# Patient Record
Sex: FEMALE | Race: WHITE
[De-identification: names, ages, dates, MRNs, and addresses within clinical notes are randomized per-mention and may not be internally consistent; named-entity substitution may affect disease eponyms.]

---

## 2019-08-23 ENCOUNTER — HOSPITAL ENCOUNTER (OUTPATIENT)
Dept: HOSPITAL 47 - RADCTMAIN | Age: 49
Discharge: HOME | End: 2019-08-23
Attending: FAMILY MEDICINE
Payer: COMMERCIAL

## 2019-08-23 DIAGNOSIS — R22.1: ICD-10-CM

## 2019-08-23 DIAGNOSIS — Z88.5: ICD-10-CM

## 2019-08-23 DIAGNOSIS — R06.02: Primary | ICD-10-CM

## 2019-08-23 PROCEDURE — 70490 CT SOFT TISSUE NECK W/O DYE: CPT

## 2019-08-23 PROCEDURE — 71250 CT THORAX DX C-: CPT

## 2019-08-23 NOTE — CT
EXAMINATION TYPE: CT neck chest without con

 

DATE OF EXAM: 8/23/2019

 

COMPARISON: none

 

HISTORY: Swollen lymph nodes

 

CT DLP: 916.8 mGycm

 

Unenhanced CT of the chest was performed with lung and mediastinal window settings submitted.  The la
ck of contrast limits evaluation of the vascular, mediastinal and parenchymal structures including th
e upper abdomen.

 

LUNGS: Large right upper lobe mass medially which has a bilobed appearance. This may reflect large co
nglomerate right paratracheal adenopathy with associated pulmonary mass. Components measure 5.0 x 6.2
 cm with smaller rounded component measuring 2.5 x 2.6 cm. Additional satellite nodule right upper lo
be measuring 1 cm. 4 mm nodule lateral segment right middle lobe.

 

MEDIASTINUM/MAGDY: See above. Massive conglomerate mediastinal adenopathy likely results in superior v
marta cava syndrome. Lack of contrast limits evaluation of the superior vena cava however. Strict clini
heriberto correlation recommended. Subcarinal adenopathy measures 2.1 cm.

 

UPPER ABDOMEN:  No significant abnormality is seen.

 

OTHER: No significant other abnormality.

 

IMPRESSION:

 

1.  Findings felt to reflect bronchogenic carcinoma with massive mediastinal adenopathy and resultant
 SVC syndrome. Lack of contrast however does limit evaluation.

 

 

 

EXAMINATION TYPE: CT neck chest without con

 

DATE OF EXAM: 8/23/2019

 

COMPARISON: none

 

HISTORY: Swollen lymph nodes

 

CT DLP: 916.8 mGycm

 

Unenhanced CT of the neck was performed from the skull base through the lung apices. The lack of cont
rast limits evaluation.

 

AIRWAY:   The supraglottic, glottic, and subglottic portions of the airway appear patent and free of 
mass.

 

SALIVARY GLANDS:  The submandibular and parotid glands are free of mass or inflammatory process.

 

THYROID GLAND:  No nodules or masses seen.

 

LYMPH NODES: No adenopathy seen greater than 1cm.

 

 

OTHER:  Vascular structures are patent although prominent.  No significant degenerative change of the
 cervical spine.  No abscess seen.

 

IMPRESSION:

No distinct abnormality of the neck.

 

 

A [place] critical message alert has been initiated for Moises Gee MD via the [employer] Critical Results System on 8/23/2019 9:10 AM.  This message alert has been sent to Moises Gee MD via the preferences provided by the clinician for the receipt of Radiology Critical Find
ings. Message ID [id number].

## 2019-09-06 ENCOUNTER — HOSPITAL ENCOUNTER (OUTPATIENT)
Dept: HOSPITAL 47 - RADPETMAIN | Age: 49
Discharge: HOME | End: 2019-09-06
Attending: INTERNAL MEDICINE
Payer: COMMERCIAL

## 2019-09-06 DIAGNOSIS — R59.0: Primary | ICD-10-CM

## 2019-09-06 DIAGNOSIS — R91.8: ICD-10-CM

## 2019-09-06 PROCEDURE — 78815 PET IMAGE W/CT SKULL-THIGH: CPT

## 2019-09-09 NOTE — PE
EXAMINATION TYPE: PET CT fusion skull to thigh

 

DATE OF EXAM: 9/6/2019

 

COMPARISON: CT neck and chest dated 8/23/2019

 

HISTORY: Findings concerning for bronchogenic carcinoma on the CT dated 8/23/2019 with marked mediast
inal adenopathy and resultant superior vena cava syndrome. No suspicious adenopathy was seen within t
he neck.  The patient is noted to be on oral steroids elevating the blood glucose. Patient states the
re was a recent biopsy performed. Outside notes state a plan of navigational bronchoscopy. 

 

TECHNIQUE:  Following the intravenous administration of 11.75. mCi of F-18 FDG, whole body images are
 performed from the skull base to the midthigh.  Images are reviewed on the computer in the coronal, 
axial, and sagittal planes.  Reconstructed rotating images are created on independent workstation and
 reviewed on the computer.   A localization and attenuation correction CT is performed in conjunction
 with the PET scan.

 

SCAN: Initial treatment strategy. No prior surgery, chemotherapy, nor radiation.

 

FINDINGS:

Mediastinal background: 1.86

Abdominal background: 3.28

 

 

SKULL BASE AND NECK:  No suspicious hypermetabolic uptake

 

CHEST, MEDIASTINUM, AND HILAR REGION: The right suprahilar mass previously measuring 5.6 x 6.2 cm or 
smaller rounded component measuring 2.5 x 2.6 cm on the exam of 8/23/2019 is markedly hypermetabolic 
with a maximum SUV of 9.3 with the more anterior smaller mass having a maximum SUV of 6.8. There is e
vidence for mediastinal invasion with mass effect on the right hilar vasculature. There is abnormal m
ediastinal adenopathy that is intimately associated with the mass and overall inseparable within the 
pretracheal, right paratracheal, precarinal, and right hilar regions. A subcarinal lymph node is 1.3 
cm in short axis and has a maximum SUV of. There is a small satellite pulmonary nodule in the right a
pex measuring 1.1 x 0.8 cm on series 3 image 69 with a maximum SUV of.

 

ABDOMEN AND PELVIS: Liver uptake is patchy overall with no discrete focal lesion on noncontrast CT. H
owever there is very slight more focal uptake in segment 4A that is similar to abdominal background w
ith a maximum SUV of 3.38. Surveillance or MRI liver could be considered.

 

There is a left labial lesion with hypermetabolic activity and SUV of 4.09. More anterior uptake towa
rds the skin surface has a maximum SUV of 9.86 within the left labia. This could represent an infecte
d Bartholin's gland cyst although direct visualization is recommended.

 

OSSEOUS STRUCTURES: No suspicious hypermetabolic uptake.

 

OTHER CT: 

Paranasal sinuses and mastoid air cells are well aerated. Atherosclerosis of the right carotid artery
 is incidentally seen. Mild multilevel degenerative changes of the spine and hips. There is very smal
l hiatal hernia suspected. Small splenule is seen. Too small to accurately characterize hepatic lesio
n is hypoattenuated on series 3 image 5. There is focal hypoattenuation in the left hepatic lobe jeffery
uring approximately 1.6 cm on series 2 image 124 and near the fissure for the falciform ligament jeffery
uring approximately 2.7 cm in segment 4A. Cholelithiasis is seen. Further workup for hepatic metastas
is is recommended with MR liver mass protocol. Very small fat filled umbilical hernia. 9

 

IMPRESSION: 

1. Findings are compatible with primary pulmonary neoplasm with mediastinal invasion and mediastinal 
adenopathy that is inseparable from the right hilar mass involving the superior right paratracheal, p
retracheal, precarinal, and right hilar spaces. This was noted to contribute to SVC syndrome on the p
rior exam of 8/23/2019.

2. No contralateral adenopathy, no pleural effusions, no supraclavicular adenopathy, and no convincin
g evidence of infradiaphragmatic metastasis nor osseous metastasis, however the liver is diffusely he
terogenous with patchy area in segment 4A that is just above background. No convincing correlate on n
oncontrast CT. Surveillance or MRI liver could be considered.

3. Marked hypermetabolic uptake near the skin surface within the left labia majora and additionally w
ithin a probable inflamed Bartholin's gland cyst on the left. Direct visualization is recommended.

## 2019-09-17 ENCOUNTER — HOSPITAL ENCOUNTER (OUTPATIENT)
Dept: HOSPITAL 47 - RADMRIMAIN | Age: 49
Discharge: HOME | End: 2019-09-17
Attending: INTERNAL MEDICINE
Payer: COMMERCIAL

## 2019-09-17 DIAGNOSIS — C34.91: Primary | ICD-10-CM

## 2019-09-17 DIAGNOSIS — R90.89: ICD-10-CM

## 2019-09-17 PROCEDURE — 70553 MRI BRAIN STEM W/O & W/DYE: CPT

## 2019-09-17 NOTE — MR
EXAMINATION TYPE: MR brain wo/w con

 

DATE OF EXAM: 9/17/2019

 

COMPARISON: None

 

HISTORY: Headaches / Lung cancer

 

TECHNIQUE: 

Multiplanar, multisequence images of the brain and brainstem is performed without and with IV contras
t, utilizing 7 mL intravenous Gadavist .

 

FINDINGS: Diffusion weighted images demonstrate no evidence of a recent infarct or other diffusion ab
normality.  There is no extra-axial fluid collection. Scattered and confluent hyperintensity present 
on inversion recovery T2-weighted sequences within the periventricular, subcortical, pericallosal whi
te matter, approximately 20-30 lesions are present. Largest is in the right frontal white matter jeffery
uring approximately 7 to 8 mm on axial image 22. The ventricular system and cisternal spaces are norm
al in size and appearance, mild asymmetry in the appearance of the lateral ventricles is likely law
l variant.  The brain volume is age appropriate. There is some artifact on the exam.

 

Midline structures demonstrate normal morphology.  The craniocervical junction appears within normal 
limits.  Post contrast images demonstrate no abnormal enhancement. The dural venous sinuses appear pa
tent. The visualized sinuses are clear and the globes are intact.

 

IMPRESSION: Nonspecific white matter demyelination, correlate for possible multiple sclerosis, hypert
ension, migraine headaches, Lyme disease, vasculitis or chronic small vessel ischemic change. No abno
rmal enhancement to suggest metastasis.

## 2019-09-19 ENCOUNTER — HOSPITAL ENCOUNTER (OUTPATIENT)
Dept: HOSPITAL 47 - RADMRIMAIN | Age: 49
Discharge: HOME | End: 2019-09-19
Attending: INTERNAL MEDICINE
Payer: COMMERCIAL

## 2019-09-19 DIAGNOSIS — K80.20: Primary | ICD-10-CM

## 2019-09-19 DIAGNOSIS — R16.0: ICD-10-CM

## 2019-09-19 DIAGNOSIS — C34.91: ICD-10-CM

## 2019-09-19 PROCEDURE — 74183 MRI ABD W/O CNTR FLWD CNTR: CPT

## 2019-09-22 NOTE — MR
EXAMINATION TYPE: MR liver wo/w con

 

DATE OF EXAM: 9/19/2019

 

COMPARISON: Correlation PET/CT 9/6/2019

 

HISTORY: 48-year-old female Lung CA

 

Technique: Multiplanar, multisequence images of the abdomen were obtained before and after administra
tion of 7 mL intravenous Gadavist gadolinium contrast.  

 

FINDINGS: 

Heart normal size without pericardial effusion. No pleural effusion.

 

Liver mildly enlarged at 18.9 cm. There is slight loss of signal on out of phase T1-weighted sequence
 suggesting minimal fatty infiltration.

 

No T2-weighted signal abnormality of the liver or discrete enhancing lesion is identified with partic
ular attention to the questioned area in segment 4 of the left liver lobe.

 

Numerous small gallstones fill the nondistended gallbladder lumen. No biliary ductal dilatation. Port
al venous system is patent.

 

Adrenal glands, kidneys, spleen with small posterior splenule, and pancreas appear within normal limi
ts.

 

No upper abdominal lymphadenopathy, ascites fluid, or gross bowel abnormality.

 

 

 

 

IMPRESSION: 

 

1. Mild hepatomegaly. There may be minimal underlying fatty infiltration.

2. No suspicious liver lesion. Given appearance on the present MRI, the questioned PET/CT finding lik
ely corresponds to normal variation heterogeneous liver uptake.

3. Cholelithiasis.

## 2019-11-07 ENCOUNTER — HOSPITAL ENCOUNTER (OUTPATIENT)
Dept: HOSPITAL 47 - RADUSWWP | Age: 49
Discharge: HOME | End: 2019-11-07
Attending: INTERNAL MEDICINE
Payer: COMMERCIAL

## 2019-11-07 DIAGNOSIS — M79.662: Primary | ICD-10-CM

## 2019-11-07 NOTE — US
EXAMINATION TYPE: US venous doppler duplex LE LT

 

DATE OF EXAM: 11/7/2019 12:12 PM

 

COMPARISON: NONE

 

CLINICAL HISTORY: M79.662 PAIN LLL,R22.42 SWELLING LLL. Left leg pain and swelling x couple days

 

SIDE PERFORMED: Left  

 

TECHNIQUE:  The lower extremity deep venous system is examined utilizing real time linear array sonog
denilson with graded compression, doppler sonography and color-flow sonography.

 

VESSELS IMAGED:

External Iliac Vein (EIV)

Common Femoral Vein

Deep Femoral Vein

Greater Saphenous Vein *

Femoral Vein

Popliteal Vein

Small Saphenous Vein *

Proximal Calf Veins

(* superficial vessels)

 

 

 

Left Leg:  Appears negative for DVT

 

Grayscale, color doppler, spectral doppler imaging performed of the deep veins of the left lower extr
emity.  There is normal flow, compressibility, vascular waveforms.

 

IMPRESSION: No ultrasound evidence for acute DVT in the left lower extremity.

## 2019-12-11 ENCOUNTER — HOSPITAL ENCOUNTER (OUTPATIENT)
Dept: HOSPITAL 47 - CATHCVL | Age: 49
End: 2019-12-11
Attending: RADIOLOGY
Payer: COMMERCIAL

## 2019-12-11 DIAGNOSIS — I87.2: ICD-10-CM

## 2019-12-11 DIAGNOSIS — Z45.2: Primary | ICD-10-CM

## 2019-12-11 DIAGNOSIS — C34.91: ICD-10-CM

## 2019-12-11 PROCEDURE — 36573 INSJ PICC RS&I 5 YR+: CPT

## 2019-12-12 NOTE — IR
EXAMINATION TYPE: IR cvc insert >=5 years

 

DATE OF EXAM: 12/12/2019

 

COMPARISON: NONE

 

CLINICAL HISTORY: Needs long-term intravenous access for chemotherapy

 

PROCEDURE:

After informed consent, the skin overlying the left basilic vein was localized with ultrasound and no
francis to be compressible and patent.  An ultrasound image was obtained and submitted on the patient's c
doe.  The overlying skin was prepped and draped and Lidocaine was used for local anesthesia.  A skin
 nick was made with a scalpel.  Access was gained to the vein under ultrasound guidance with a 21 gau
ge needle and a 0.018 inch wire was advanced.  Access site was dilated with Peel-Away sheath and cath
eter tailored to the appropriate length and advanced such that the distal tip is at the cavoatrial ju
nction.  Spot image was obtained verifying placement.  Catheter was fixed to the skin and a sterile d
ressing was placed following hemostasis.  Catheter was aspirated and flushed with saline.  Patient wa
s discharged in stable condition without complication.Maximal barrier technique is utilized.  Ultraso
und image is documented on the chart. Ultrasound used with sterile technique. 

 

Fluoro time and fluoroscopic images submitted to document procedure: 65 intraoperative C-arm images, 
0.3 minutes of fluoroscopy time

 

IMPRESSION: STATUS POST ULTRASOUND AND FLUOROSCOPIC GUIDED PICC LINE PLACEMENT, READY FOR USE.  THIS 
PROCEDURE WAS PERFORMED BY THE UNDERSIGNED.

## 2019-12-20 ENCOUNTER — HOSPITAL ENCOUNTER (OUTPATIENT)
Dept: HOSPITAL 47 - PROCWHC3 | Age: 49
Discharge: HOME | End: 2019-12-20
Attending: INTERNAL MEDICINE
Payer: COMMERCIAL

## 2019-12-20 VITALS
TEMPERATURE: 98.2 F | HEART RATE: 80 BPM | SYSTOLIC BLOOD PRESSURE: 107 MMHG | RESPIRATION RATE: 16 BRPM | DIASTOLIC BLOOD PRESSURE: 63 MMHG

## 2019-12-20 DIAGNOSIS — D64.81: Primary | ICD-10-CM

## 2019-12-20 PROCEDURE — 86920 COMPATIBILITY TEST SPIN: CPT

## 2019-12-20 PROCEDURE — 86900 BLOOD TYPING SEROLOGIC ABO: CPT

## 2019-12-20 PROCEDURE — 36430 TRANSFUSION BLD/BLD COMPNT: CPT

## 2019-12-20 PROCEDURE — 86850 RBC ANTIBODY SCREEN: CPT

## 2019-12-20 PROCEDURE — 86901 BLOOD TYPING SEROLOGIC RH(D): CPT

## 2019-12-20 RX ADMIN — SODIUM CHLORIDE, PRESERVATIVE FREE PRN ML: 5 INJECTION INTRAVENOUS at 13:03

## 2019-12-20 RX ADMIN — SODIUM CHLORIDE, PRESERVATIVE FREE PRN ML: 5 INJECTION INTRAVENOUS at 14:36
